# Patient Record
Sex: FEMALE | Race: WHITE
[De-identification: names, ages, dates, MRNs, and addresses within clinical notes are randomized per-mention and may not be internally consistent; named-entity substitution may affect disease eponyms.]

---

## 2021-10-25 ENCOUNTER — HOSPITAL ENCOUNTER (OUTPATIENT)
Dept: HOSPITAL 53 - M WHC | Age: 21
End: 2021-10-25
Attending: OBSTETRICS & GYNECOLOGY
Payer: COMMERCIAL

## 2021-10-25 DIAGNOSIS — Z3A.08: ICD-10-CM

## 2021-10-25 DIAGNOSIS — O20.9: ICD-10-CM

## 2021-10-25 DIAGNOSIS — O09.891: Primary | ICD-10-CM

## 2021-10-25 NOTE — REP
INDICATION:

DATING AND VIABILITY.



COMPARISON:

None.



TECHNIQUE:

Real-time sonographic evaluation of gravid uterus performed.



FINDINGS:

There is a single living intrauterine gestation.  The estimated gestational age is 8

weeks 3 days based on a crown-rump length of 19 mm, EDC 06/03/2022.  Fetal heart rate

174 beats per minute.  Cervix is closed measures 3.7 cm in length.  Subchorionic

hemorrhage is noted 2.0 x 1.1 x 1.5 cm.  Cystic structure of the right ovary measures

2.6 cm in maximum diameter likely representing a corpus luteum.



IMPRESSION:

Single living intrauterine gestation, estimated gestational age 8 weeks 3 days and EDC

06/03/2022.  Fetal heart rate 174 beats per minute.  Subchorionic hemorrhage noted.





<Electronically signed by Gregorio Eagle > 10/25/21 [id number]

## 2021-11-16 ENCOUNTER — HOSPITAL ENCOUNTER (OUTPATIENT)
Dept: HOSPITAL 53 - M PLALAB | Age: 21
End: 2021-11-16
Attending: OBSTETRICS & GYNECOLOGY
Payer: COMMERCIAL

## 2021-11-16 DIAGNOSIS — Z36.89: ICD-10-CM

## 2021-11-16 DIAGNOSIS — Z34.81: Primary | ICD-10-CM

## 2023-04-17 ENCOUNTER — HOSPITAL ENCOUNTER (OUTPATIENT)
Dept: HOSPITAL 53 - M LAB REF | Age: 23
End: 2023-04-17
Attending: STUDENT IN AN ORGANIZED HEALTH CARE EDUCATION/TRAINING PROGRAM
Payer: COMMERCIAL

## 2023-04-17 DIAGNOSIS — R30.0: Primary | ICD-10-CM
